# Patient Record
Sex: FEMALE | Race: WHITE | Employment: UNEMPLOYED | URBAN - NONMETROPOLITAN AREA
[De-identification: names, ages, dates, MRNs, and addresses within clinical notes are randomized per-mention and may not be internally consistent; named-entity substitution may affect disease eponyms.]

---

## 2022-02-28 ENCOUNTER — OFFICE VISIT (OUTPATIENT)
Dept: ENT CLINIC | Age: 72
End: 2022-02-28
Payer: COMMERCIAL

## 2022-02-28 VITALS
WEIGHT: 220 LBS | RESPIRATION RATE: 18 BRPM | HEIGHT: 67 IN | DIASTOLIC BLOOD PRESSURE: 74 MMHG | HEART RATE: 88 BPM | BODY MASS INDEX: 34.53 KG/M2 | SYSTOLIC BLOOD PRESSURE: 128 MMHG | OXYGEN SATURATION: 95 % | TEMPERATURE: 97.4 F

## 2022-02-28 DIAGNOSIS — H72.93 PERFORATED TYMPANIC MEMBRANE, BILATERAL: ICD-10-CM

## 2022-02-28 DIAGNOSIS — T16.1XXA FOREIGN BODY OF RIGHT EAR, INITIAL ENCOUNTER: ICD-10-CM

## 2022-02-28 DIAGNOSIS — M26.623 BILATERAL TEMPOROMANDIBULAR JOINT PAIN: ICD-10-CM

## 2022-02-28 DIAGNOSIS — H66.016: Primary | ICD-10-CM

## 2022-02-28 PROCEDURE — 99203 OFFICE O/P NEW LOW 30 MIN: CPT | Performed by: OTOLARYNGOLOGY

## 2022-02-28 PROCEDURE — 69200 CLEAR OUTER EAR CANAL: CPT | Performed by: OTOLARYNGOLOGY

## 2022-02-28 RX ORDER — AMOXICILLIN AND CLAVULANATE POTASSIUM 875; 125 MG/1; MG/1
1 TABLET, FILM COATED ORAL 2 TIMES DAILY
COMMUNITY

## 2022-02-28 RX ORDER — DILTIAZEM HYDROCHLORIDE 180 MG/1
180 CAPSULE, COATED, EXTENDED RELEASE ORAL DAILY
COMMUNITY

## 2022-02-28 RX ORDER — CIPROFLOXACIN AND DEXAMETHASONE 3; 1 MG/ML; MG/ML
4 SUSPENSION/ DROPS AURICULAR (OTIC) 2 TIMES DAILY
COMMUNITY

## 2022-02-28 RX ORDER — BENZONATATE 200 MG/1
200 CAPSULE ORAL 3 TIMES DAILY PRN
COMMUNITY

## 2022-02-28 RX ORDER — MECLIZINE HYDROCHLORIDE 25 MG/1
25 TABLET ORAL 3 TIMES DAILY PRN
COMMUNITY

## 2022-02-28 ASSESSMENT — ENCOUNTER SYMPTOMS
RHINORRHEA: 0
SINUS PRESSURE: 0
VOICE CHANGE: 0
EYES NEGATIVE: 1
ALLERGIC/IMMUNOLOGIC NEGATIVE: 1
SINUS PAIN: 0
FACIAL SWELLING: 0
RESPIRATORY NEGATIVE: 1
SORE THROAT: 0
TROUBLE SWALLOWING: 0
GASTROINTESTINAL NEGATIVE: 1

## 2022-02-28 NOTE — PROGRESS NOTES
[facility]  [address] [facility] [address]  Dept: [phone]  MD Kenyon Wright 66 y.o. female     Patient presents with a chief complaint of Ear Drainage (c/o ear pain and drainage. )       /74   Pulse 88   Temp 97.4 °F (36.3 °C)   Resp 18   Ht 5' 7\" (1.702 m)   Wt 220 lb (99.8 kg)   SpO2 95%   BMI 34.46 kg/m²       History of Presenting Illness: The patient/caregiver reports a history of complaint with the following features: Onset: started a few years ago after steroid injections  Timing: new onset pains  Duration: comes and goes  Quality: bilateral ear pain  Location: both ears  Severity: pain moderate stabbing type that radiates to neck  Risk factors: prior tympanic perforations  Alleviating factors: nothing gives relief NSAIDS do not help, ear drops have not helped for recent pains and drainage  Aggravating factors: wearing hearing aids  Associated factors: vertigo, lightheadedness, ear drainage, pain, hearing loss  She reports that she has seen several other ENT, none have helped her and 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 She is unclear on the rationale for prior treatments or results of other therapies. Review of systems covering 10 systems is reviewed as staff entry in other note and pertinent positives and negatives noted. No past medical history on file.     Current Outpatient Medications:     ciprofloxacin-dexamethasone (CIPRODEX) 0.3-0.1 % otic suspension, 4 drops 2 times daily, Disp: , Rfl:     benzonatate (TESSALON) 200 MG capsule, Take 200 mg by mouth 3 times daily as needed for Cough, Disp: , Rfl:     amoxicillin-clavulanate (AUGMENTIN) 875-125 MG per tablet, Take 1 tablet by mouth 2 times daily, Disp: , Rfl:     dilTIAZem (CARDIZEM CD) 180 MG extended release capsule, Take 180 mg by mouth daily, Disp: , Rfl:     meclizine (ANTIVERT) 25 MG tablet, Take 25 mg by mouth 3 times daily as needed, Disp: , Rfl:     rivaroxaban (XARELTO) 20 MG TABS tablet, Take 20 mg by mouth, Disp: , Rfl:     Multiple Vitamins-Minerals (VITAMIN D3 COMPLETE PO), Take by mouth, Disp: , Rfl:    No Known Allergies   No past surgical history on file. Social History     Socioeconomic History    Marital status:      Spouse name: Not on file    Number of children: Not on file    Years of education: Not on file    Highest education level: Not on file   Occupational History    Not on file   Tobacco Use    Smoking status: Not on file    Smokeless tobacco: Never Used   Substance and Sexual Activity    Alcohol use: Not on file    Drug use: Not on file    Sexual activity: Not on file   Other Topics Concern    Not on file   Social History Narrative    Not on file     Social Determinants of Health     Financial Resource Strain:     Difficulty of Paying Living Expenses: Not on file   Food Insecurity:     Worried About Running Out of Food in the Last Year: Not on file    Harvey of Food in the Last Year: Not on file   Transportation Needs:     Lack of Transportation (Medical): Not on file    Lack of Transportation (Non-Medical):  Not on file   Physical Activity:     Days of Exercise per Week: Not on file    Minutes of Exercise per Session: Not on file   Stress:     Feeling of Stress : Not on file   Social Connections:     Frequency of Communication with Friends and Family: Not on file    Frequency of Social Gatherings with Friends and Family: Not on file    Attends [employer] Services: Not on file    Active Member of Clubs or Organizations: Not on file    Attends Club or Organization Meetings: Not on file    Marital Status: Not on file   Intimate Partner Violence:     Fear of Current or Ex-Partner: Not on file    Emotionally Abused: Not on file    Physically Abused: Not on file    Sexually Abused: Not on file Housing Stability:     Unable to Pay for Housing in the Last Year: Not on file    Number of Places Lived in the Last Year: Not on file    Unstable Housing in the Last Year: Not on file     No family history on file.      PHYSICAL EXAM:    The patient was examined today 2/28/2022 with findings as follows:    CONSTITUTIONAL:    General Appearance: well-appearing, nontoxic, alert, no acute distress     Communication: reduced understanding at normal conversational tones, normal voicing, speech intelligible    HEAD/FACE:    Head: atraumatic, normocephalic, no lesions    Facial Inspection: no lesions, healthy skin    Facial Strength: motor strength normal, symmetric strength, symmetric movement, motor strength    Sinuses: no sinus tenderness    Salivary Glands: no enlargements of parotid glands, no tenderness of parotid glands, no masses of parotid glands, clear salivary flow on palpation from Stensen's ducts, no duct stones of Stensen's duct, no enlargement of submandibular glands, no tenderness of submandibular glands, no masses of submandibular glands, clear salivary flow from [place]'s ducts, no stones of Swetha's ducts    Temporomandibular Joint: crepitus with motion, tenderness on palpation, no trismus, motion asymmetric    EYES:    Pupils: PERRLA, extra-ocular movements intact, no nystagmus, sclera white, no redness of eyes, no watering of eyes    EARS:    Bilateral External Ears: no pits, no tags    Right External Ear: normally formed, no lesions, no mastoid tenderness    Left External Ear: normally formed, no lesions, no mastoid tenderness    Right External Auditory Canal: impacted cotton swab with fungal overgrowth removed    Left External Auditory Canal: normal, healthy skin, no obstructing cerumen, no discharge    Right Tympanic Membrane: perforated with purulent discharge, suctioned clear    Left Tympanic Membrane: perforated with clear mucoid discharge    Hearing: reduced to spoken voice    NOSE:    Nasal Skin: no lesions, no lacerations, no scars    Nasal Dorsum: symmetric with no visible or palpable deformities    Nasal Tip: normal symmetric nasal tip, normal nasal valves    Nasal Mucosa: normal, pink and moist    Septum: not markedly deformed, midline, no exposed vessels, no bleeding, no septal granuloma    Turbinates: normal size and conformation    Nasopharynx: normal    ORAL CAVITY/MOUTH:    Lips, teeth, gums: normal lips, normal gums, dentition intact, no dental pain on palpation    Oral Mucosa: normal, moist, no lesions    Palate: normal hard palate, normal soft palate, symmetric palatal elevation    Floor of Mouth: normal floor of mouth    Tongue: normal tongue, no lesions, no edema, no masses, normal mucosa, mobile    Tonsils: normal tonsils, symmetric, no lesions    Posterior pharynx: normal    NECK:    Neck: no masses, trachea midline, normal range of motion, no cysts or pits, no tenderness to palpation    Thyroid: normal thyroid, no enlargement, no tenderness, no nodules    LYMPH NODES:    Cervical: no palpable lymph node enlargement    SKIN:    General Appearance: no lesions, warm and dry, normal turgor, no bruising    NEUROLOGICAL SYSTEM:    Orientation: oriented to time, oriented to place, oriented to person    PSYCHIATRIC:    Mood and affect: normal mood, abnormal affect      REMOVAL OF FOREIGN BODY-EAR PROCEDURE NOTE (33737)    PROCEDURE PERFORMED BY: Nena Baker MD    PROCEDURE DATE: 2/28/2022    With the patient and/or caregivers consent, the operative microscope is used to visualize the right ear canal through an otic speculum where a foreign body is noted. This is removed with a cup forceps. The underlying ear canal is with purulent discharge. On inspection of the tympanic membrane this is noted to be perforated. The patient tolerated the procedure well without complication.   The patient is advised to avoid water exposure to the ear and to call for significant pain, drainage, bleeding, or change in hearing. Assessment and Plan:    She presents with bilateral ear drainage and radiating pains to the neck with a long history of unsuccessful treatments. I suspect her ear drainage and pains may be unrelated and find significant TMJ on exam which may account for her radiation pains. She also has a foreign body of the right ear canal, likely from her attempts as self cleaning, and care to avoid instrumentation of the ear canals is given today. I have asked her to continue the Ciprodex drops for the next 10 days and we will reassess. She reports that she has fluctuating hearing loss and right neck swelling that has responded to IV antibiotics in the past although she admits that imaging has not shown any abnormality. Diagnosis Orders   1. Acute suppr otitis media w spon rupt ear drum, recurrent, bi     2. Foreign body of right ear, initial encounter     3. Perforated tympanic membrane, bilateral     4. Bilateral temporomandibular joint pain        No follow-ups on file. The patient and/or caregiver is to notify the office if no improvement or worsening of symptoms is noted prior to the scheduled follow-up for sooner evaluation. The patient and/or caregiver is able to state an understanding of these recommendations and is agreeable to the treatment plan. --Nelson Mcfarland MD on 2/28/2022 at 1:21 PM    An electronic signature was used to authenticate this note.

## 2022-02-28 NOTE — PROGRESS NOTES
Review of Systems   Constitutional: Negative. HENT: Positive for ear discharge and ear pain. Negative for congestion, dental problem, drooling, facial swelling, hearing loss, mouth sores, nosebleeds, postnasal drip, rhinorrhea, sinus pressure, sinus pain, sneezing, sore throat, tinnitus, trouble swallowing and voice change. Eyes: Negative. Respiratory: Negative. Cardiovascular: Negative. Gastrointestinal: Negative. Endocrine: Negative. Genitourinary: Negative. Musculoskeletal: Negative. Allergic/Immunologic: Negative. Neurological: Negative. Hematological: Negative. Psychiatric/Behavioral: Negative.

## 2022-02-28 NOTE — PATIENT INSTRUCTIONS
SURVEY:    You may be receiving a survey from [employer] regarding your visit today. Please complete the survey to enable us to provide the highest quality of care to you and your family. If you cannot score us a very good on any question, please call the office to discuss how we could have made your experience a very good one. Thank you.   [address] [place] [address]  MD Renetta Bolivar MD Chiquita Faes, MD Melvina Begun, MD Pansy Perks, MD Cyrus Melnick, Uriel Del Cid [place], [place]  Kierra Nunez LPN  [facility] [place] [facility], [address] [place] [address]  Maida Dixonos, PCA

## 2022-03-01 ENCOUNTER — TELEPHONE (OUTPATIENT)
Dept: ENT CLINIC | Age: 72
End: 2022-03-01

## 2022-03-01 NOTE — TELEPHONE ENCOUNTER
Patient called stating she was seen yesterday in office and ever since her ear has been draining a lot, she is just wanting to make sure this is normal     Health Maintenance   Topic Date Due    Hepatitis C screen  Never done    COVID-19 Vaccine (1) Never done    Depression Screen  Never done    DTaP/Tdap/Td vaccine (1 - Tdap) Never done    Lipid screen  Never done    Colorectal Cancer Screen  Never done    Breast cancer screen  Never done    Shingles Vaccine (1 of 2) Never done    DEXA (modify frequency per FRAX score)  Never done    Pneumococcal 65+ years Vaccine (1 of 1 - PPSV23) Never done    Flu vaccine (1) Never done    Hepatitis A vaccine  Aged Out    Hepatitis B vaccine  Aged Out    Hib vaccine  Aged Out    Meningococcal (ACWY) vaccine  Aged Out             (applicable per patient's age: Cancer Screenings, Depression Screening, Fall Risk Screening, Immunizations)    No results found for: LABA1C, LABMICR, LDLCHOLESTEROL, LDLCALC, AST, ALT, BUN   (goal A1C is < 7)   (goal LDL is <100) need 30-50% reduction from baseline     BP Readings from Last 3 Encounters:   02/28/22 128/74    (goal /80)      All Future Testing planned in [facility]:      Next Visit Date:  Future Appointments   Date Time Provider Azucena Marie   3/14/2022  2:00 PM Johanny Pelaez MD [address] [place] [address] ENT [facility]            Patient Active Problem List:     Perforated tympanic membrane, bilateral     Bilateral temporomandibular joint pain  ,h

## 2022-03-01 NOTE — TELEPHONE ENCOUNTER
Please advise her that her is was draining at her visit and this was suctioned, but I expect it to continue to drain which is why ear drops were prescribed. I hope that thi drainage will subside with the use of these drops.

## 2022-03-14 ENCOUNTER — OFFICE VISIT (OUTPATIENT)
Dept: ENT CLINIC | Age: 72
End: 2022-03-14
Payer: COMMERCIAL

## 2022-03-14 VITALS
RESPIRATION RATE: 18 BRPM | BODY MASS INDEX: 34.53 KG/M2 | WEIGHT: 220 LBS | TEMPERATURE: 96.8 F | HEIGHT: 67 IN | DIASTOLIC BLOOD PRESSURE: 82 MMHG | SYSTOLIC BLOOD PRESSURE: 134 MMHG

## 2022-03-14 DIAGNOSIS — H72.93 PERFORATED TYMPANIC MEMBRANE, BILATERAL: ICD-10-CM

## 2022-03-14 DIAGNOSIS — G52.1 NEURALGIA OF LEFT GLOSSOPHARYNGEAL NERVE: ICD-10-CM

## 2022-03-14 PROCEDURE — 99213 OFFICE O/P EST LOW 20 MIN: CPT | Performed by: OTOLARYNGOLOGY

## 2022-03-14 ASSESSMENT — ENCOUNTER SYMPTOMS
RESPIRATORY NEGATIVE: 1
EYES NEGATIVE: 1
VOICE CHANGE: 0
SINUS PAIN: 0
FACIAL SWELLING: 0
SINUS PRESSURE: 0
TROUBLE SWALLOWING: 0
GASTROINTESTINAL NEGATIVE: 1
RHINORRHEA: 0
ALLERGIC/IMMUNOLOGIC NEGATIVE: 1
SORE THROAT: 0

## 2022-03-14 NOTE — PROGRESS NOTES
[facility]  [address] [address]  Dept: [phone]  MD Valentino Torres 66 y.o. female     Patient presents with a chief complaint of 2 Week Follow-Up (Patient states right ear is good and left ear hurts. )       /82   Temp 96.8 °F (36 °C)   Resp 18   Ht 5' 7\" (1.702 m)   Wt 220 lb (99.8 kg)   BMI 34.46 kg/m²       History of Presenting Illness: The patient/caregiver reports a history of complaint with the following features: Onset: resolved discharge  Timing: come and goes  Duration: since tympanic perforations form steroid injections  Quality: still having left sided shooting neck pains  Location: left neck  Severity: pain moderate  Risk factors: was seen at [facility] by Dr. Chalino Dumas who considered Eagle's syndrome  Alleviating factors: nothing gives relief  Aggravating factors: chewing and swallowing  Associated factors: coughs to point of vomiting, left ear and neck pains    Review of systems covering 10 systems is reviewed as staff entry in other note and pertinent positives and negatives noted. No past medical history on file.     Current Outpatient Medications:     ciprofloxacin-dexamethasone (CIPRODEX) 0.3-0.1 % otic suspension, 4 drops 2 times daily, Disp: , Rfl:     benzonatate (TESSALON) 200 MG capsule, Take 200 mg by mouth 3 times daily as needed for Cough, Disp: , Rfl:     amoxicillin-clavulanate (AUGMENTIN) 875-125 MG per tablet, Take 1 tablet by mouth 2 times daily, Disp: , Rfl:     dilTIAZem (CARDIZEM CD) 180 MG extended release capsule, Take 180 mg by mouth daily, Disp: , Rfl:     meclizine (ANTIVERT) 25 MG tablet, Take 25 mg by mouth 3 times daily as needed, Disp: , Rfl:     rivaroxaban (XARELTO) 20 MG TABS tablet, Take 20 mg by mouth, Disp: , Rfl:     Multiple Vitamins-Minerals (VITAMIN D3 COMPLETE PO), Take by mouth, Disp: , Rfl:    No Known Allergies   No past surgical history on file. Social History     Socioeconomic History    Marital status:      Spouse name: Not on file    Number of children: Not on file    Years of education: Not on file    Highest education level: Not on file   Occupational History    Not on file   Tobacco Use    Smoking status: Never Smoker    Smokeless tobacco: Never Used   Substance and Sexual Activity    Alcohol use: Not on file    Drug use: Not on file    Sexual activity: Not on file   Other Topics Concern    Not on file   Social History Narrative    Not on file     Social Determinants of Health     Financial Resource Strain:     Difficulty of Paying Living Expenses: Not on file   Food Insecurity:     Worried About Running Out of Food in the Last Year: Not on file    Harvey of Food in the Last Year: Not on file   Transportation Needs:     Lack of Transportation (Medical): Not on file    Lack of Transportation (Non-Medical): Not on file   Physical Activity:     Days of Exercise per Week: Not on file    Minutes of Exercise per Session: Not on file   Stress:     Feeling of Stress : Not on file   Social Connections:     Frequency of Communication with Friends and Family: Not on file    Frequency of Social Gatherings with Friends and Family: Not on file    Attends [employer] Services: Not on file    Active Member of [address] [place] [address] [employer] or Organizations: Not on file    Attends Club or Organization Meetings: Not on file    Marital Status: Not on file   Intimate Partner Violence:     Fear of Current or Ex-Partner: Not on file    Emotionally Abused: Not on file    Physically Abused: Not on file    Sexually Abused: Not on file   Housing Stability:     Unable to Pay for Housing in the Last Year: Not on file    Number of [place] in the Last Year: Not on file    Unstable Housing in the Last Year: Not on file     No family history on file.      PHYSICAL EXAM:    The patient was examined today 3/14/2022 with findings as follows:    CONSTITUTIONAL:     General Appearance: well-appearing, nontoxic, alert, no acute distress      Communication: reduced understanding at normal conversational tones, normal voicing, speech intelligible     HEAD/FACE:     Head: atraumatic, normocephalic, no lesions     Facial Inspection: no lesions, healthy skin     Facial Strength: motor strength normal, symmetric strength, symmetric movement, motor strength     Sinuses: no sinus tenderness     Salivary Glands: no enlargements of parotid glands, no tenderness of parotid glands, no masses of parotid glands, clear salivary flow on palpation from Stensen's ducts, no duct stones of Stensen's duct, no enlargement of submandibular glands, no tenderness of submandibular glands, no masses of submandibular glands, clear salivary flow from [place]'s ducts, no stones of Swetha's ducts     Temporomandibular Joint: crepitus with motion, tenderness on palpation, no trismus, motion asymmetric     EYES:     Pupils: PERRLA, extra-ocular movements intact, no nystagmus, sclera white, no redness of eyes, no watering of eyes     EARS:     Bilateral External Ears: no pits, no tags     Right External Ear: normally formed, no lesions, no mastoid tenderness     Left External Ear: normally formed, no lesions, no mastoid tenderness     Right External Auditory Canal: normal, healthy skin, no obstructing cerumen, no discharge     Left External Auditory Canal: normal, healthy skin, no obstructing cerumen, no discharge     Right Tympanic Membrane: perforated 1 mm and dry     Left Tympanic Membrane: perforated 6 mm and dry     Hearing: reduced to spoken voice     NOSE:     Nasal Skin: no lesions, no lacerations, no scars     Nasal Dorsum: symmetric with no visible or palpable deformities     Nasal Tip: normal symmetric nasal tip, normal nasal valves     Nasal Mucosa: normal, pink and moist     Septum: not markedly deformed, midline, no exposed vessels, no bleeding, no septal granuloma     Turbinates: normal size and conformation     Nasopharynx: normal     ORAL CAVITY/MOUTH:     Lips, teeth, gums: normal lips, normal gums, dentition intact, no dental pain on palpation     Oral Mucosa: normal, moist, no lesions     Palate: normal hard palate, normal soft palate, symmetric palatal elevation     Floor of Mouth: normal floor of mouth     Tongue: normal tongue, no lesions, no edema, no masses, normal mucosa, mobile     Tonsils: normal tonsils, symmetric, no lesions     Posterior pharynx: normal     NECK:     Neck: no masses, trachea midline, normal range of motion, no cysts or pits, no tenderness to palpation     Thyroid: normal thyroid, no enlargement, no tenderness, no nodules     LYMPH NODES:     Cervical: no palpable lymph node enlargement     SKIN:     General Appearance: no lesions, warm and dry, normal turgor, no bruising     NEUROLOGICAL SYSTEM:     Orientation: oriented to time, oriented to place, oriented to person     PSYCHIATRIC:     Mood and affect: normal mood, abnormal affect          Assessment and Plan:    Her ear drainage has improved. I suspect this is due to her perforated tympanic membranes likely exacerbated by hearing and use, although she reports she keeps this to a minimum. Repair options versus observation for now are discussed. She also continues to complain of left neck pain and swelling accompanied by ear and tongue pain, triggered by swallow, and cough to point of vomiting. Although Eagles syndrome has been considered, no elongation of the stylohyoid ligament has been demonstrated on imaging. I feel that this is more consistent with glossopharyngeal neuralgia and evaluation with pain management for ablation options may be beneficial.     Diagnosis Orders   1. Perforated tympanic membrane, bilateral     2. Neuralgia of left glossopharyngeal nerve        Return in about 3 months (around 6/14/2022).     The patient and/or caregiver is to notify the office if no improvement or worsening of symptoms is noted prior to the scheduled follow-up for sooner evaluation. The patient and/or caregiver is able to state an understanding of these recommendations and is agreeable to the treatment plan. --Susi Fraga MD on 3/14/2022 at 2:53 PM    An electronic signature was used to authenticate this note.

## 2022-03-14 NOTE — PATIENT INSTRUCTIONS
SURVEY:    You may be receiving a survey from [employer] regarding your visit today. Please complete the survey to enable us to provide the highest quality of care to you and your family. If you cannot score us a very good on any question, please call the office to discuss how we could have made your experience a very good one. Thank you.   [address] [place] [address]  Dan Sauger, MD Moshe Kemp, MD Netta Babinski, MD Sari Hamman, MD Forest Ehlers, MD Shelvy Cola, AuD Amy [place], [place]  Padmini Chacon LPN  [facility] [place] [address]  NIL[facility] Low[place]

## 2022-03-14 NOTE — PROGRESS NOTES
Review of Systems   Constitutional: Negative. HENT: Positive for ear discharge and ear pain. Negative for congestion, dental problem, drooling, facial swelling, hearing loss, mouth sores, nosebleeds, postnasal drip, rhinorrhea, sinus pressure, sinus pain, sneezing, sore throat, tinnitus, trouble swallowing and voice change. Eyes: Negative. Respiratory: Negative. Cardiovascular: Negative. Gastrointestinal: Negative. Endocrine: Negative. Genitourinary: Negative. Musculoskeletal: Negative. Skin: Negative. Allergic/Immunologic: Negative. Neurological: Negative. Hematological: Negative. Psychiatric/Behavioral: Negative.

## 2022-04-18 ENCOUNTER — OFFICE VISIT (OUTPATIENT)
Dept: ENT CLINIC | Age: 72
End: 2022-04-18
Payer: COMMERCIAL

## 2022-04-18 VITALS
HEART RATE: 66 BPM | OXYGEN SATURATION: 98 % | SYSTOLIC BLOOD PRESSURE: 132 MMHG | BODY MASS INDEX: 32.89 KG/M2 | WEIGHT: 210 LBS | RESPIRATION RATE: 18 BRPM | TEMPERATURE: 97.4 F | DIASTOLIC BLOOD PRESSURE: 84 MMHG

## 2022-04-18 DIAGNOSIS — H72.93 PERFORATED TYMPANIC MEMBRANE, BILATERAL: ICD-10-CM

## 2022-04-18 DIAGNOSIS — H65.02 ACUTE SEROUS OTITIS MEDIA OF LEFT EAR WITHOUT RUPTURE: Primary | ICD-10-CM

## 2022-04-18 PROCEDURE — 99213 OFFICE O/P EST LOW 20 MIN: CPT | Performed by: OTOLARYNGOLOGY

## 2022-04-18 ASSESSMENT — ENCOUNTER SYMPTOMS
SORE THROAT: 1
SINUS PRESSURE: 1
ALLERGIC/IMMUNOLOGIC NEGATIVE: 1
GASTROINTESTINAL NEGATIVE: 1

## 2022-04-18 NOTE — PROGRESS NOTES
Review of Systems   Constitutional: Positive for fatigue. HENT: Positive for ear discharge, ear pain, hearing loss, sinus pressure and sore throat. Cardiovascular: Negative. Gastrointestinal: Negative. Endocrine: Negative. Genitourinary: Negative. Musculoskeletal: Negative. Skin: Negative. Allergic/Immunologic: Negative. Neurological: Negative. Hematological: Negative. Psychiatric/Behavioral: Negative.

## 2022-04-18 NOTE — PROGRESS NOTES
[facility]  [address] [place] [address]  Dept: Marcela Adams MD    Tosha Offer 67 y.o. female     Patient presents with a chief complaint of Otalgia (x 4 days. c/o bilateral ear pain, swelling, and drainage.)       /84   Pulse 66   Temp 97.4 °F (36.3 °C) (Temporal)   Resp 18   Wt 210 lb (95.3 kg)   SpO2 98%   BMI 32.89 kg/m²       History of Presenting Illness: The patient/caregiver reports a history of complaint with the following features: Onset: started 4 days ago  Timing: new onset  Duration: few days  Quality: drainage and discomfort, worse on left  Location: both ears  Severity: pain mild, reduced hearing even with hearing aids  Risk factors: tympanic perforations  Alleviating factors: nothing gives relief  Aggravating factors: nothing makes it worse  Associated factors: off balance, longstanding    Review of systems covering 10 systems is reviewed as staff entry in other note and pertinent positives and negatives noted. No past medical history on file. Current Outpatient Medications:     ciprofloxacin-dexamethasone (CIPRODEX) 0.3-0.1 % otic suspension, 4 drops 2 times daily, Disp: , Rfl:     benzonatate (TESSALON) 200 MG capsule, Take 200 mg by mouth 3 times daily as needed for Cough, Disp: , Rfl:     amoxicillin-clavulanate (AUGMENTIN) 875-125 MG per tablet, Take 1 tablet by mouth 2 times daily, Disp: , Rfl:     dilTIAZem (CARDIZEM CD) 180 MG extended release capsule, Take 180 mg by mouth daily, Disp: , Rfl:     meclizine (ANTIVERT) 25 MG tablet, Take 25 mg by mouth 3 times daily as needed, Disp: , Rfl:     rivaroxaban (XARELTO) 20 MG TABS tablet, Take 20 mg by mouth, Disp: , Rfl:     Multiple Vitamins-Minerals (VITAMIN D3 COMPLETE PO), Take by mouth, Disp: , Rfl:    No Known Allergies   No past surgical history on file.    Social History Socioeconomic History    Marital status:      Spouse name: Not on file    Number of children: Not on file    Years of education: Not on file    Highest education level: Not on file   Occupational History    Not on file   Tobacco Use    Smoking status: Never Smoker    Smokeless tobacco: Never Used   Substance and Sexual Activity    Alcohol use: Not on file    Drug use: Not on file    Sexual activity: Not on file   Other Topics Concern    Not on file   Social History Narrative    Not on file     Social Determinants of Health     Financial Resource Strain:     Difficulty of Paying Living Expenses: Not on file   Food Insecurity:     Worried About Running Out of Food in the Last Year: Not on file    [place] of Food in the Last Year: Not on file   Transportation Needs:     Lack of Transportation (Medical): Not on file    Lack of Transportation (Non-Medical): Not on file   Physical Activity:     Days of Exercise per Week: Not on file    Minutes of Exercise per Session: Not on file   Stress:     Feeling of Stress : Not on file   Social Connections:     Frequency of Communication with Friends and Family: Not on file    Frequency of Social Gatherings with Friends and Family: Not on file    Attends Church Services: Not on file    Active Member of [address] [place] [address] [employer] or Organizations: Not on file    Attends Club or Organization Meetings: Not on file    Marital Status: Not on file   Intimate Partner Violence:     Fear of Current or Ex-Partner: Not on file    Emotionally Abused: Not on file    Physically Abused: Not on file    Sexually Abused: Not on file   Housing Stability:     Unable to Pay for Housing in the Last Year: Not on file    Number of [place] in the Last Year: Not on file    Unstable Housing in the Last Year: Not on file     No family history on file.      PHYSICAL EXAM:    The patient was examined today 4/18/2022 with findings as follows:    CONSTITUTIONAL:     General Appearance: well-appearing, nontoxic, alert, no acute distress      Communication: reduced understanding at normal conversational tones, normal voicing, speech intelligible     HEAD/FACE:     Head: atraumatic, normocephalic, no lesions     Facial Inspection: no lesions, healthy skin     Facial Strength: motor strength normal, symmetric strength, symmetric movement, motor strength     Sinuses: no sinus tenderness     Salivary Glands: no enlargements of parotid glands, no tenderness of parotid glands, no masses of parotid glands, clear salivary flow on palpation from Stensen's ducts, no duct stones of Stensen's duct, no enlargement of submandibular glands, no tenderness of submandibular glands, no masses of submandibular glands, clear salivary flow from [place] ducts, no stones of [place]'s ducts     Temporomandibular Joint: crepitus with motion, tenderness on palpation, no trismus, motion asymmetric     EYES:     Pupils: PERRLA, extra-ocular movements intact, no nystagmus, sclera white, no redness of eyes, no watering of eyes     EARS:     Bilateral External Ears: no pits, no tags     Right External Ear: normally formed, no lesions, no mastoid tenderness     Left External Ear: normally formed, no lesions, no mastoid tenderness     Right External Auditory Canal: normal, healthy skin, no obstructing cerumen, no discharge     Left External Auditory Canal: normal, healthy skin, no obstructing cerumen, no discharge     Right Tympanic Membrane: perforated 1 mm and dry     Left Tympanic Membrane: perforated 6 mm with serous exudates, suctioned clear     Hearing: reduced to spoken voice     NOSE:     Nasal Skin: no lesions, no lacerations, no scars     Nasal Dorsum: symmetric with no visible or palpable deformities     Nasal Tip: normal symmetric nasal tip, normal nasal valves     Nasal Mucosa: normal, pink and moist     Septum: not markedly deformed, midline, no exposed vessels, no bleeding, no septal granuloma     Turbinates: normal size and conformation     Nasopharynx: normal     ORAL CAVITY/MOUTH:     Lips, teeth, gums: normal lips, normal gums, dentition intact, no dental pain on palpation     Oral Mucosa: normal, moist, no lesions     Palate: normal hard palate, normal soft palate, symmetric palatal elevation     Floor of Mouth: normal floor of mouth     Tongue: normal tongue, no lesions, no edema, no masses, normal mucosa, mobile     Tonsils: normal tonsils, symmetric, no lesions     Posterior pharynx: normal     NECK:     Neck: no masses, trachea midline, normal range of motion, no cysts or pits, no tenderness to palpation     Thyroid: normal thyroid, no enlargement, no tenderness, no nodules     LYMPH NODES:     Cervical: no palpable lymph node enlargement     SKIN:     General Appearance: no lesions, warm and dry, normal turgor, no bruising     NEUROLOGICAL SYSTEM:     Orientation: oriented to time, oriented to place, oriented to person     PSYCHIATRIC:     Mood and affect: normal mood, abnormal affect    Assessment and Plan:    She has some clear serous discharge from the left ear. This is suctioned to clear the debris from the ear canal and middle ear space with improved hearing. The patient and/or caregiver is advised on the use of any prescribed medication with use of her Ciprodex drops for a few days and the avoidance of water exposure to reduce the risk of repeat infection. A return visit in two weeks for follow-up evaluation is recommended. The patient and/or caregiver is to notify the office if no improvement or worsening of symptoms is noted prior to the scheduled follow-up for sooner evaluation. The patient and/or caregiver is able to state an understanding of these recommendations and is agreeable to the treatment plan. Diagnosis Orders   1. Acute serous otitis media of left ear without rupture     2.  Perforated tympanic membrane, bilateral        Return in about 8 weeks (around 6/15/2022). The patient and/or caregiver is to notify the office if no improvement or worsening of symptoms is noted prior to the scheduled follow-up for sooner evaluation. The patient and/or caregiver is able to state an understanding of these recommendations and is agreeable to the treatment plan. --Pelon Sanchez MD on 4/18/2022 at 11:24 AM    An electronic signature was used to authenticate this note.

## 2022-04-22 ENCOUNTER — OFFICE VISIT (OUTPATIENT)
Dept: PAIN MANAGEMENT | Age: 72
End: 2022-04-22
Payer: COMMERCIAL

## 2022-04-22 DIAGNOSIS — M26.609 TMJ (TEMPOROMANDIBULAR JOINT SYNDROME): Primary | ICD-10-CM

## 2022-04-22 PROCEDURE — 99204 OFFICE O/P NEW MOD 45 MIN: CPT | Performed by: PHYSICAL MEDICINE & REHABILITATION

## 2022-04-22 RX ORDER — GABAPENTIN 300 MG/1
300 CAPSULE ORAL 3 TIMES DAILY
Qty: 90 CAPSULE | Refills: 2 | Status: SHIPPED | OUTPATIENT
Start: 2022-04-22 | End: 2022-05-22

## 2022-04-22 RX ORDER — TRAMADOL HYDROCHLORIDE 50 MG/1
50 TABLET ORAL EVERY 8 HOURS PRN
Qty: 21 TABLET | Refills: 0 | Status: SHIPPED | OUTPATIENT
Start: 2022-04-22 | End: 2022-04-29

## 2022-04-22 NOTE — PROGRESS NOTES
Pain Management Consultation    [address]  [place] [address]  Dept: [phone]    No referring provider defined for this encounter. Linda Butt is a 67 y.o. female, who came today due to  left jaw, submandibular pain     Cause of the symptom(s): chronic pain      Had a dental implant which precipitated the pain. Quality of Symptoms: unbearable, aching and throbbing    Aggravating factors: chewing and bitting     Relieving factors: ice pack  and use of medication    Current pain level: 9 on the scale of 0-10 ( 10 being worst)     No Known Allergies    Social History     Socioeconomic History    Marital status:      Spouse name: Not on file    Number of children: Not on file    Years of education: Not on file    Highest education level: Not on file   Occupational History    Not on file   Tobacco Use    Smoking status: Never Smoker    Smokeless tobacco: Never Used   Substance and Sexual Activity    Alcohol use: Not on file    Drug use: Not on file    Sexual activity: Not on file   Other Topics Concern    Not on file   Social History Narrative    Not on file     Social Determinants of Health     Financial Resource Strain:     Difficulty of Paying Living Expenses: Not on file   Food Insecurity:     Worried About Running Out of Food in the Last Year: Not on file    [place] of Food in the Last Year: Not on file   Transportation Needs:     Lack of Transportation (Medical): Not on file    Lack of Transportation (Non-Medical):  Not on file   Physical Activity:     Days of Exercise per Week: Not on file    Minutes of Exercise per Session: Not on file   Stress:     Feeling of Stress : Not on file   Social Connections:     Frequency of Communication with Friends and Family: Not on file    Frequency of Social Gatherings with Friends and Family: Not on file    Attends [employer] Services: Not on file    Active Member of Clubs or Organizations: Not on file    Attends Club or Organization Meetings: Not on file    Marital Status: Not on file   Intimate Partner Violence:     Fear of Current or Ex-Partner: Not on file    Emotionally Abused: Not on file    Physically Abused: Not on file    Sexually Abused: Not on file   Housing Stability:     Unable to Pay for Housing in the Last Year: Not on file    Number of [place] in the Last Year: Not on file    Unstable Housing in the Last Year: Not on file       No past medical history on file. No past surgical history on file. Prior to Visit Medications    Medication Sig Taking? Authorizing Provider   ciprofloxacin-dexamethasone (CIPRODEX) 0.3-0.1 % otic suspension 4 drops 2 times daily  Historical Provider, MD   benzonatate (TESSALON) 200 MG capsule Take 200 mg by mouth 3 times daily as needed for Cough  Historical Provider, MD   amoxicillin-clavulanate (AUGMENTIN) 875-125 MG per tablet Take 1 tablet by mouth 2 times daily  Historical Provider, MD   dilTIAZem (CARDIZEM CD) 180 MG extended release capsule Take 180 mg by mouth daily  Historical Provider, MD   meclizine (ANTIVERT) 25 MG tablet Take 25 mg by mouth 3 times daily as needed  Historical Provider, MD   rivaroxaban (XARELTO) 20 MG TABS tablet Take 20 mg by mouth  Historical Provider, MD   Multiple Vitamins-Minerals (VITAMIN D3 COMPLETE PO) Take by mouth  Historical Provider, MD       No family history on file. Review of Systems : All systems reviewed, all unremarkable other than HPI/subjective. No change since last visit. Denies  fever, chills, infection or non healing wound. Physical Exam  Constitutional:       General: She is not in acute distress. Appearance: Normal appearance. HENT:      Head: Atraumatic. Comments: Left TMJ tenderness    Eyes:      Extraocular Movements: Extraocular movements intact.    Cardiovascular:      Rate and Rhythm: Normal rate and regular rhythm. Pulmonary:      Effort: Pulmonary effort is normal. No respiratory distress. Musculoskeletal:      Cervical back: Neck supple. Neurological:      Mental Status: She is alert and oriented to person, place, and time. Psychiatric:         Mood and Affect: Mood normal.         Behavior: Behavior normal.         Assessment and Plan:      Diagnosis Orders   1. TMJ (temporomandibular joint syndrome)         Increase gabapentin to TID. Trial of tramadol for pain TID prn. Ff up as 6 weeks. I have reviewed the chief complaint and HPI including the [address] and Vital documentation by my staff and I agree with their documentation and have added where applicable. Time spent with patient was  45  minutes. More than 50% was spent counseling/coordinating the patient's care.        Nhan Werner MD   Spine Medicine/PM&R

## 2022-05-20 ENCOUNTER — TELEPHONE (OUTPATIENT)
Dept: PAIN MANAGEMENT | Age: 72
End: 2022-05-20

## 2022-05-20 DIAGNOSIS — R22.1 MASS IN NECK: Primary | ICD-10-CM

## 2022-06-07 ENCOUNTER — TELEPHONE (OUTPATIENT)
Dept: ENT CLINIC | Age: 72
End: 2022-06-07

## 2022-06-07 NOTE — TELEPHONE ENCOUNTER
Patient called to inform doctor that her tv set on 100 volume without [employer] in she can not hear it, she also said her ears feel full.  Please advise if you would like to respond

## 2023-07-20 NOTE — TELEPHONE ENCOUNTER
Patient called and she is taking prescribed gabapentin  And she feels like this medicine is making her feel worse.  Please advise
Please advise the patient to wean off the gabapentin.
She is reporting mass in the neck. Concern about increasing in size. Will defer to ENT for evaluation. Consider MRI of the neck with and without contrast if mass in a concern. However, he will clear the \"leads in place\" post cardiac procedure and see if we can perform MRI.
show